# Patient Record
Sex: MALE | Race: OTHER | NOT HISPANIC OR LATINO | URBAN - METROPOLITAN AREA
[De-identification: names, ages, dates, MRNs, and addresses within clinical notes are randomized per-mention and may not be internally consistent; named-entity substitution may affect disease eponyms.]

---

## 2021-09-28 ENCOUNTER — EMERGENCY (EMERGENCY)
Facility: HOSPITAL | Age: 46
LOS: 1 days | Discharge: HOME | End: 2021-09-28
Attending: EMERGENCY MEDICINE
Payer: MEDICAID

## 2021-09-28 VITALS
HEART RATE: 101 BPM | HEIGHT: 64 IN | OXYGEN SATURATION: 99 % | SYSTOLIC BLOOD PRESSURE: 149 MMHG | WEIGHT: 179.9 LBS | RESPIRATION RATE: 18 BRPM | TEMPERATURE: 98 F | DIASTOLIC BLOOD PRESSURE: 103 MMHG

## 2021-09-28 VITALS
OXYGEN SATURATION: 99 % | DIASTOLIC BLOOD PRESSURE: 95 MMHG | SYSTOLIC BLOOD PRESSURE: 135 MMHG | TEMPERATURE: 98 F | HEART RATE: 79 BPM | RESPIRATION RATE: 18 BRPM

## 2021-09-28 LAB
ALBUMIN SERPL ELPH-MCNC: 4.6 G/DL — SIGNIFICANT CHANGE UP (ref 3.3–5)
ALP SERPL-CCNC: 73 U/L — SIGNIFICANT CHANGE UP (ref 40–120)
ALT FLD-CCNC: 42 U/L — SIGNIFICANT CHANGE UP (ref 10–45)
ANION GAP SERPL CALC-SCNC: 12 MMOL/L — SIGNIFICANT CHANGE UP (ref 5–17)
AST SERPL-CCNC: 29 U/L — SIGNIFICANT CHANGE UP (ref 10–40)
BASE EXCESS BLDV CALC-SCNC: 2 MMOL/L — SIGNIFICANT CHANGE UP (ref -2–2)
BASOPHILS # BLD AUTO: 0.05 K/UL — SIGNIFICANT CHANGE UP (ref 0–0.2)
BASOPHILS NFR BLD AUTO: 0.7 % — SIGNIFICANT CHANGE UP (ref 0–2)
BILIRUB SERPL-MCNC: 0.6 MG/DL — SIGNIFICANT CHANGE UP (ref 0.2–1.2)
BUN SERPL-MCNC: 12 MG/DL — SIGNIFICANT CHANGE UP (ref 7–23)
CA-I SERPL-SCNC: 1.28 MMOL/L — SIGNIFICANT CHANGE UP (ref 1.15–1.33)
CALCIUM SERPL-MCNC: 9.6 MG/DL — SIGNIFICANT CHANGE UP (ref 8.4–10.5)
CHLORIDE BLDV-SCNC: 103 MMOL/L — SIGNIFICANT CHANGE UP (ref 96–108)
CHLORIDE SERPL-SCNC: 104 MMOL/L — SIGNIFICANT CHANGE UP (ref 96–108)
CK SERPL-CCNC: 303 U/L — HIGH (ref 30–200)
CO2 BLDV-SCNC: 31 MMOL/L — HIGH (ref 22–26)
CO2 SERPL-SCNC: 24 MMOL/L — SIGNIFICANT CHANGE UP (ref 22–31)
CREAT SERPL-MCNC: 0.97 MG/DL — SIGNIFICANT CHANGE UP (ref 0.5–1.3)
CRP SERPL-MCNC: <3 MG/L — SIGNIFICANT CHANGE UP (ref 0–4)
D DIMER BLD IA.RAPID-MCNC: <150 NG/ML DDU — SIGNIFICANT CHANGE UP
EOSINOPHIL # BLD AUTO: 0.23 K/UL — SIGNIFICANT CHANGE UP (ref 0–0.5)
EOSINOPHIL NFR BLD AUTO: 3.3 % — SIGNIFICANT CHANGE UP (ref 0–6)
GAS PNL BLDV: 137 MMOL/L — SIGNIFICANT CHANGE UP (ref 136–145)
GAS PNL BLDV: SIGNIFICANT CHANGE UP
GLUCOSE BLDV-MCNC: 126 MG/DL — HIGH (ref 70–99)
GLUCOSE SERPL-MCNC: 122 MG/DL — HIGH (ref 70–99)
HCO3 BLDV-SCNC: 29 MMOL/L — SIGNIFICANT CHANGE UP (ref 22–29)
HCT VFR BLD CALC: 45.9 % — SIGNIFICANT CHANGE UP (ref 39–50)
HCT VFR BLDA CALC: 45 % — SIGNIFICANT CHANGE UP (ref 39–51)
HGB BLD CALC-MCNC: 15.1 G/DL — SIGNIFICANT CHANGE UP (ref 12.6–17.4)
HGB BLD-MCNC: 14.6 G/DL — SIGNIFICANT CHANGE UP (ref 13–17)
IMM GRANULOCYTES NFR BLD AUTO: 0.3 % — SIGNIFICANT CHANGE UP (ref 0–1.5)
LACTATE BLDV-MCNC: 1.1 MMOL/L — SIGNIFICANT CHANGE UP (ref 0.7–2)
LYMPHOCYTES # BLD AUTO: 2.11 K/UL — SIGNIFICANT CHANGE UP (ref 1–3.3)
LYMPHOCYTES # BLD AUTO: 29.9 % — SIGNIFICANT CHANGE UP (ref 13–44)
MAGNESIUM SERPL-MCNC: 2 MG/DL — SIGNIFICANT CHANGE UP (ref 1.6–2.6)
MCHC RBC-ENTMCNC: 25.4 PG — LOW (ref 27–34)
MCHC RBC-ENTMCNC: 31.8 GM/DL — LOW (ref 32–36)
MCV RBC AUTO: 79.8 FL — LOW (ref 80–100)
MONOCYTES # BLD AUTO: 0.53 K/UL — SIGNIFICANT CHANGE UP (ref 0–0.9)
MONOCYTES NFR BLD AUTO: 7.5 % — SIGNIFICANT CHANGE UP (ref 2–14)
NEUTROPHILS # BLD AUTO: 4.12 K/UL — SIGNIFICANT CHANGE UP (ref 1.8–7.4)
NEUTROPHILS NFR BLD AUTO: 58.3 % — SIGNIFICANT CHANGE UP (ref 43–77)
NRBC # BLD: 0 /100 WBCS — SIGNIFICANT CHANGE UP (ref 0–0)
PCO2 BLDV: 54 MMHG — SIGNIFICANT CHANGE UP (ref 42–55)
PH BLDV: 7.34 — SIGNIFICANT CHANGE UP (ref 7.32–7.43)
PHOSPHATE SERPL-MCNC: 3 MG/DL — SIGNIFICANT CHANGE UP (ref 2.5–4.5)
PLATELET # BLD AUTO: 189 K/UL — SIGNIFICANT CHANGE UP (ref 150–400)
PO2 BLDV: 29 MMHG — SIGNIFICANT CHANGE UP (ref 25–45)
POTASSIUM BLDV-SCNC: 3.8 MMOL/L — SIGNIFICANT CHANGE UP (ref 3.5–5.1)
POTASSIUM SERPL-MCNC: 3.8 MMOL/L — SIGNIFICANT CHANGE UP (ref 3.5–5.3)
POTASSIUM SERPL-SCNC: 3.8 MMOL/L — SIGNIFICANT CHANGE UP (ref 3.5–5.3)
PROT SERPL-MCNC: 7 G/DL — SIGNIFICANT CHANGE UP (ref 6–8.3)
RBC # BLD: 5.75 M/UL — SIGNIFICANT CHANGE UP (ref 4.2–5.8)
RBC # FLD: 13.6 % — SIGNIFICANT CHANGE UP (ref 10.3–14.5)
SAO2 % BLDV: 50.3 % — LOW (ref 67–88)
SARS-COV-2 RNA SPEC QL NAA+PROBE: SIGNIFICANT CHANGE UP
SODIUM SERPL-SCNC: 140 MMOL/L — SIGNIFICANT CHANGE UP (ref 135–145)
WBC # BLD: 7.06 K/UL — SIGNIFICANT CHANGE UP (ref 3.8–10.5)
WBC # FLD AUTO: 7.06 K/UL — SIGNIFICANT CHANGE UP (ref 3.8–10.5)

## 2021-09-28 PROCEDURE — U0003: CPT

## 2021-09-28 PROCEDURE — 85014 HEMATOCRIT: CPT

## 2021-09-28 PROCEDURE — 70450 CT HEAD/BRAIN W/O DYE: CPT | Mod: 26,59,MA

## 2021-09-28 PROCEDURE — 82435 ASSAY OF BLOOD CHLORIDE: CPT

## 2021-09-28 PROCEDURE — 93970 EXTREMITY STUDY: CPT | Mod: 26

## 2021-09-28 PROCEDURE — 99285 EMERGENCY DEPT VISIT HI MDM: CPT | Mod: CS

## 2021-09-28 PROCEDURE — 93970 EXTREMITY STUDY: CPT

## 2021-09-28 PROCEDURE — 72131 CT LUMBAR SPINE W/O DYE: CPT | Mod: MA

## 2021-09-28 PROCEDURE — 70496 CT ANGIOGRAPHY HEAD: CPT | Mod: 26,MA

## 2021-09-28 PROCEDURE — 85018 HEMOGLOBIN: CPT

## 2021-09-28 PROCEDURE — 83605 ASSAY OF LACTIC ACID: CPT

## 2021-09-28 PROCEDURE — 82330 ASSAY OF CALCIUM: CPT

## 2021-09-28 PROCEDURE — 84132 ASSAY OF SERUM POTASSIUM: CPT

## 2021-09-28 PROCEDURE — 84100 ASSAY OF PHOSPHORUS: CPT

## 2021-09-28 PROCEDURE — 84295 ASSAY OF SERUM SODIUM: CPT

## 2021-09-28 PROCEDURE — 85025 COMPLETE CBC W/AUTO DIFF WBC: CPT

## 2021-09-28 PROCEDURE — 70498 CT ANGIOGRAPHY NECK: CPT | Mod: MA

## 2021-09-28 PROCEDURE — 85379 FIBRIN DEGRADATION QUANT: CPT

## 2021-09-28 PROCEDURE — 80053 COMPREHEN METABOLIC PANEL: CPT

## 2021-09-28 PROCEDURE — 82550 ASSAY OF CK (CPK): CPT

## 2021-09-28 PROCEDURE — 85045 AUTOMATED RETICULOCYTE COUNT: CPT

## 2021-09-28 PROCEDURE — U0005: CPT

## 2021-09-28 PROCEDURE — 70498 CT ANGIOGRAPHY NECK: CPT | Mod: 26,MA

## 2021-09-28 PROCEDURE — 70450 CT HEAD/BRAIN W/O DYE: CPT | Mod: MA

## 2021-09-28 PROCEDURE — 82947 ASSAY GLUCOSE BLOOD QUANT: CPT

## 2021-09-28 PROCEDURE — 82962 GLUCOSE BLOOD TEST: CPT

## 2021-09-28 PROCEDURE — 82803 BLOOD GASES ANY COMBINATION: CPT

## 2021-09-28 PROCEDURE — 36415 COLL VENOUS BLD VENIPUNCTURE: CPT

## 2021-09-28 PROCEDURE — 82728 ASSAY OF FERRITIN: CPT

## 2021-09-28 PROCEDURE — 72131 CT LUMBAR SPINE W/O DYE: CPT | Mod: 26,MA

## 2021-09-28 PROCEDURE — 83735 ASSAY OF MAGNESIUM: CPT

## 2021-09-28 PROCEDURE — 70496 CT ANGIOGRAPHY HEAD: CPT | Mod: MA

## 2021-09-28 PROCEDURE — 99284 EMERGENCY DEPT VISIT MOD MDM: CPT | Mod: 25

## 2021-09-28 PROCEDURE — 86769 SARS-COV-2 COVID-19 ANTIBODY: CPT

## 2021-09-28 PROCEDURE — 82565 ASSAY OF CREATININE: CPT

## 2021-09-28 PROCEDURE — 86140 C-REACTIVE PROTEIN: CPT

## 2021-09-28 NOTE — ED PROVIDER NOTE - OBJECTIVE STATEMENT
46y M pmhx htn, pre-dm, presents to ED with the cc of b/l LE weakness/numbness/tingling x 1.5 weeks. Pt states symptoms initially in distal lower extremities and has progressively ascended up both legs. Last night pt unable to walk when getting out of bed and came to ED for eval. Severity waxes and wanes worse with prolonged walking or prolonged sitting/lying. Endorses genital tingling. No back pain or trauma. No bowel or bladder dysfunction. No f/c or IV drug use. Pt also endorses numbness to the L side of his head and neck x 4 months. Recently diagnosed with "nerve problem." Denies vision, changes, n/v.

## 2021-09-28 NOTE — ED PROVIDER NOTE - NSFOLLOWUPINSTRUCTIONS_ED_ALL_ED_FT
1) Follow-up with your primary care provider in 1-2 days.        Follow-up with your neurologist or at the [facility] within 1-2 weeks at [address] [place] [address] call [phone] to schedule this appointment.              Follow-up with [facility] within 1-2 weeks    [facility]  Sleep Disorders Center  [phone]  Fax: [phone] [address] [place] [address]    2) Continue to take all medications as prescribed.    3) Rest and drink plenty of water. Pain can be managed with Acetaminophen (aka Tylenol) and Ibuprofen (aka Motrin or Advil) over the counter as directed. Take with food.    4) Return to the ER for any new or worsening symptoms.

## 2021-09-28 NOTE — CONSULT NOTE ADULT - SUBJECTIVE AND OBJECTIVE BOX
NEUROLOGY DEPT. INPATIENT CONSULT  Consult Information:   NS page [id number] ([id number]) before calling spectra [id number] unless code, urgent, transfer  [facility] page [id number] ([id number]) before calling spectra [id number] unless code, urgent, transfer      HISTORY OF PRESENT ILLNESS:    GEORGIA MOYER is a 46y ([birth year]) Male presenting 09-28-21 Emergency with CC     REVIEW OF SYMPTOMS  A 10-point ROS was *****    PAST MEDICAL & SURGICAL HISTORY:   COVID-19 vaccine series completed  H/O: HTN (hypertension)    HOME MEDICATIONS:     EMERGENCY DEPARTMENT:    ALLERGIES:     OBJECTIVE DATA  Vital Signs Last 24 Hrs  T(C): 36.7 (28 Sep 2021 09:14), Max: 36.7 (28 Sep 2021 09:14)  T(F): 98 (28 Sep 2021 09:14), Max: 98 (28 Sep 2021 09:14)  HR: 93 (28 Sep 2021 09:40) (93 - 101)  BP: 135/93 (28 Sep 2021 09:40) (135/93 - 149/103)  RR: 16 (28 Sep 2021 09:40) (16 - 18)  SpO2: 99% (28 Sep 2021 09:40) (99% - 99%)    I&O's Summary    PHYSICAL EXAM:  Constitutional-   Pulm-  Cardio    ROUTINE STUDIES:  CAPILLARY BLOOD GLUCOSE  POCT Blood Glucose.: 126 mg/dL (28 Sep 2021 10:32)                        14.6   7.06  )-----------( 189      ( 28 Sep 2021 10:31 )             45.9     09-28    140  |  104  |  12  ----------------------------<  122<H>  3.8   |  24  |  0.97    Ca    9.6      28 Sep 2021 10:31  Phos  3.0     09-28  Mg     2.0     09-28    TPro  7.0  /  Alb  4.6  /  TBili  0.6  /  DBili  x   /  AST  29  /  ALT  42  /  AlkPhos  73  09-28    NEUROIMAGING:                   NEUROLOGY DEPT. INPATIENT CONSULT  Consult Information:   NS page 1282 ([id number]) before calling spectra [id number] unless code, urgent, transfer  [facility] page [id number] ([email or web address]) before calling spectra [id number] unless code, urgent, transfer      HISTORY OF PRESENT ILLNESS:    GEORGIA MOYER is a 46y ([birth year]) RHd Male PMHx occipital neuralgia s/p trigger point injections, intermittent cervical radiculopathy / sciatica on gabapentin  presenting 09-28-21 Emergency with CC headache, intermittent LE paresthesias over feet and shins x2 weeks in s/o 6-8 months of mirage of worsening neurological symptoms. At present, patient feels at his baseline but was concerned with the number of symptoms he has been having, the LE paresthesias that prevented him from sleeping last night and an episode of paralysis this morning that he should come to ED.    States he has been following a neurologist for few months for his discomfort with head/neck symptoms and had cervical CT that had findings to explain the intermittent radiation of pain, numbness/tingling he gets from neck into arms, neck. Occasionally has similar nerve pain in legs that arises from back. He states he has persistent numbness over the L scalp with radiation from posterior to anterior head that improved with trigger point injection but did not relieve the episodes where he additionally experiences uL > bL eye pain, photophobia, hyperacousics, allodynia, numbness over check withOUT claudication, slurred speech, or involvement in body, which are not present at this time.     Pt endorses he has a long standing history of hypersomnolence prior to moving to [place] and has noticed all his symptoms described are worsened in setting of episodes when he is "fighting sleep" especially when driving, at work. Pt endorses bL, symmetric numbness/tingling, weakness, pain that improves with rest most persistent in the feet and shins. He recently saw his daughter, was told he was pre-diabetic but otherwise studies were okay. He endorses being on his feet long hours as  but only more recently has become a problem with lower legs below knee that comes/goes. At night, with worse episode last evening, the paresthesias was extremely irritating as he feels restless legs and wants to keep moving them, rubbing them which helps. States last night this was preventing his sleep so he took melatonin and then awoke this morning to feel "weak in my whole being like I can not keep my eyes open, can not move any part of body". After an hour patient states he was able to feel more awake and move all his limbs as usual. Presently all symptoms are resolved. He states last week when he was working he had a similar event where he felt whole body weakness out of no where with weird feeling in body which resolved completely. Can not recall what was going on at that time emotionally but says usually if he is forcing himself to stay awake all his symptoms get worse and he has had episodes of falling asleep inappropriately. Notes at work if hes allowed to take short nap he wakes up refreshed and with improved symptoms including current shins, feet paresthesias. Endorses hypnagogic hallucination last week where felt sensation that a body was in bed with him (wife) but was later told that she was never there. No loss of consciousness, head trama, car accidents, family history of sleep disorders, migraine, seizure, stroke to his knowledge. Eats well rounded diet including meat, no recent sickness, fever, nausea/vomiting, constipation, cough, viral illness, chest pain, sob, snoring.     At this time, patient states he feels well and does not need any medication for any symptoms. Largely was more concerned around all the symptoms he has been having.     REVIEW OF SYMPTOMS  A 10-point ROS was completed and negative except those listed above.    PAST MEDICAL & SURGICAL HISTORY:   COVID-19 vaccine series completed  H/O: HTN (hypertension)    HOME MEDICATIONS:   one blood pressure medication QD, forgets name   multivitamin     ALLERGIES:     OBJECTIVE DATA  Vital Signs Last 24 Hrs  T(C): 36.7 (28 Sep 2021 09:14), Max: 36.7 (28 Sep 2021 09:14)  T(F): 98 (28 Sep 2021 09:14), Max: 98 (28 Sep 2021 09:14)  HR: 93 (28 Sep 2021 09:40) (93 - 101)  BP: 135/93 (28 Sep 2021 09:40) (135/93 - 149/103)  RR: 16 (28 Sep 2021 09:40) (16 - 18)  SpO2: 99% (28 Sep 2021 09:40) (99% - 99%)    PHYSICAL EXAM:  Constitutional- sitting at end of bed, appears tired   Pulm- comfortable RR on room air  Extremities - no edema, rashes, open wounds   HEENT - + bL occipital / cervical trigger points without Spinal pin point tenderness  NEUROLOGICAL EXAM  - Mental Status:  AAOx3; speech is fluent with intact naming and comprehension  - Cranial Nerves II-XII:    II:  PERRLA; visual fields are full to confrontation  III, IV, VI:  EOMI, no nystagmus  V:  facial sensation is intact in the V1-V3 distribution bilaterally ; endorses intermittent numbness over L V2  VII:  face is symmetric with normal eye closure and smile  VIII:  hearing is intact to finger rub  IX, X:  uvula is midline and soft palate rises symmetrically  XI:  head turning and shoulder shrug are intact bilaterally  XII:  tongue protrudes in the midline  - Motor:  strength is 5/5 throughout; normal muscle bulk and tone throughout; no pronator drift ; stands sustained on tippy toes / heels without issues  - Reflexes:  2+ and symmetric at the biceps, triceps, brachioradialis; 2 and symmetric knees, and ankles;  plantar reflexes downgoing bilaterally  - Sensory:  intact to light touch, pin prick, and joint-position sense throughout upper/lower limbs, digits ; 2 point discrimination intact in all fingers   - Coordination:  finger-nose-finger and heel-knee-shin intact without dysmetria; rapid alternating hand movements intact  - Gait:  normal steps, base, arm swing, and turning; heel and toe walking are normal; tandem gait is normal; Romberg testing is negative      ROUTINE STUDIES:  CAPILLARY BLOOD GLUCOSE  POCT Blood Glucose.: 126 mg/dL (28 Sep 2021 10:32)                        14.6   7.06  )-----------( 189      ( 28 Sep 2021 10:31 )             45.9     09-28    140  |  104  |  12  ----------------------------<  122<H>  3.8   |  24  |  0.97    Ca    9.6      28 Sep 2021 10:31  Phos  3.0     09-28  Mg     2.0     09-28    TPro  7.0  /  Alb  4.6  /  TBili  0.6  /  DBili  x   /  AST  29  /  ALT  42  /  AlkPhos  73  09-28    NEUROIMAGING:    CTH/CTA, Lumbar Spine REVIEWED

## 2021-09-28 NOTE — ED PROVIDER NOTE - ATTENDING CONTRIBUTION TO CARE
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 Pt comes to ed c/o low back pain onset past 1.5 wk. 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 Pt comes to ed c/o low back pain onset past 1.5 wk. Pain worse if he is sitting for a long time and he tries to get up. Rad down both legs. No Change in bowel or bladder habits,no numbness to buttox, No fever, chills. no trauma. Pain waxes and wanes. Took tylenol with mod relief. when wears off pain recurrs. Has pain w ambulating. Has also c/o left side ha for months off/on with numbness occasionally parietal scalp. 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 Pt comes to ed c/o low back pain onset past 1.5 wk. Pain worse if he is sitting for a long time and he tries to get up. Rad down both legs. No Change in bowel or bladder habits,no numbness to buttox, No fever, chills. no trauma. Pain waxes and wanes. Took tylenol with mod relief. when wears off pain recurrs. Has pain w ambulating. Has also c/o left side ha for months off/on with numbness occasionally parietal scalp. PE WDWN male awake alert normocephalic atraumatic neck supple chest clear anterior & posterior cv no rubs, gallops or murmurs abd soft +Bs no mass guarding rebound, Neuro gcs 15 speech fluent power 5/5 all extr sensation intact gait normal romberg normal. Slight pain w slr bria  Eddy Velazquez MD, Facep

## 2021-09-28 NOTE — ED PROVIDER NOTE - CLINICAL SUMMARY MEDICAL DECISION MAKING FREE TEXT BOX
PT pw #2 problems #1 low back pain rad to bria legs past 1.5w, No red flags, weakness, fever, change in bowel/bladder habits, ivda, cancer. #2 ha left for months w numbness. to parietal scalp off/on. low back pain by itself not alarming but w chronic patton will ct for ro mass lesions, tx pain if all neg f/u pain management pmd  Eddy Velazquez MD, Facep

## 2021-09-28 NOTE — ED ADULT NURSE NOTE - OBJECTIVE STATEMENT
46y male a/ox4 ambulatory with steady gait with PMH of HTN, presenting to ed with low back pain x 1.5 wk. Pain worse if he is sitting for a long time and he tries to get up. Rad down both legs. No Change in bowel or bladder habits, no numbness to buttock, No fever, chills. no trauma. Pain waxes and wanes. Took tylenol with mod relief. when wears off pain recurrs. Has pain w ambulating. Has also c/o left side ha for months off/on with numbness occasionally parietal scalp.

## 2021-09-28 NOTE — CONSULT NOTE ADULT - ASSESSMENT
GEORGIA MOYER is a 46y ([birth year]) RHd Male  PMHx occipital neuralgia s/p trigger point injections, intermittent cervical radiculopathy / sciatica on gabapentin presenting 09-28-21 Emergency with CC headache, LE paresthesias over feet and shins, episode of whole body weakness in s/o 6-8 months of mirage of worsening neurological symptoms. At present, pt feels at his baseline but was concerned with the number of symptoms he has been having, the LE paresthesias that prevented him from sleeping last night subsequently following by an episode of paralysis this morning that resolved. On examination, pt is neurologically intact + occipital trigger points on palpation.     Most of patient's symptoms are associated around sleep / disorder, (episodic whole body weakness w intact awareness, hypersomnolence, worsening of lower leg symptoms at night)  and recommended he be evaluated outpatient with sleep study. 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     Impression  multiple neurologic complaints w LOW concern for emergency/inpatient conditions including GBS, cord compression, stroke ; would rule out venous thrombosis    Plan:  discharge to home with outpatient neurology / sleep medicine follow up   [] LE duplex to rule out DVT   - CTH, CTA, Lumbar spine reviewed   - no further imaging recommended (cervical CT already done outpatient)   -  home gabapentin   - Patient can follow up with general neurology at [address] [place] [address] 1-2 weeks after discharge. Please instruct the patient to call [phone] to schedule this appointment or his neurologist as discussed      to follow up regarding  trigeminal autonomic cephalgia vs migrainous etiology of historical uL > bL eye pain, photophobia, hyperacousics, allodynia, numbness over cheek  - Patient should be evaluated by Sleep Medicine / Pulmonology to rule out sleep disorder       to follow up regarding episodic whole body weakness w intact awareness, hypersomnolence, worsening of lower leg symptoms at night    Attending Attestation to Follow.

## 2021-09-28 NOTE — ED PROVIDER NOTE - PHYSICAL EXAMINATION
GEN: Pt non-toxic in NAD, A&Ox3.  PSYCH: Affect and mood appropriate.  EYES: Sclera white w/o injection, EOMI, PERRLA.   ENT: Head NCAT. Neck supple FROM. Airway patent.  RESP: CTA b/l, no wheezes, rales, or rhonchi.   CARDIAC: RRR, clear distinct S1, S2, no appreciable murmurs.  ABD: Abdomen soft, non-tender. No CVAT b/l.  MSK: No joint erythema or obvious deformity. No obvious spinal deformity, no midline spinal ttp, no step-offs. FROM b/l UE and LE w/o pain.   NEURO: No focal motor or sensory deficits. Normal gait. Strength 5/5 throughout. Decreased sensation b/l LE L>R.   VASC: Radial and dorsalis pedis pulses 2+ b/l. No edema or calf tenderness.  SKIN: No rashes or lesions.

## 2021-09-28 NOTE — ED PROVIDER NOTE - PROGRESS NOTE DETAILS
Jerad Hoover PA-C: neuro paged Jerad Hoover PA-C: Hx varies from initial hx obtained by Dr. Velazquez. Case discussed and plan reviewed. Jerad Hoover PA-C: s/w neuro. will see pt in ED. Recs given over phone - Ct angio head, inflammatory labs, covid antibodies. Endorsed to Dr BESSY Velazquez MD, Facep Jerad Hoover PA-C: Labs and imaging non-actionable. Neuro spent extensive amount of time w/ pt. Stable for dc from neuro standpoint, rec LE duplex prior to dc. Symptoms possibly due to sleep disorder. Pt to f/u with neuro and sleep medicine. Jerad Hoover PA-C: Labs and imaging non-actionable. Neuro spent extensive amount of time w/ pt. Stable for dc from neuro standpoint, rec LE duplex prior to dc. Symptoms possibly due to sleep disorder. Ambulating in ED w/o difficulty. Pt to f/u with neuro and sleep medicine. Jerad Hoover PA-C: No evidence of dvt. Pt stable for DC.

## 2021-09-28 NOTE — ED PROVIDER NOTE - PATIENT PORTAL LINK FT
You can access the FollowMyHealth Patient Portal offered by [facility] by registering at the following website: [email or web address]. By joining [facility]’s FollowMyHealth portal, you will also be able to view your health information using other applications (apps) compatible with our system.

## 2021-09-29 LAB
COVID-19 NUCLEOCAPSID GAM AB INTERP: NEGATIVE — SIGNIFICANT CHANGE UP
COVID-19 NUCLEOCAPSID TOTAL GAM ANTIBODY RESULT: 0.07 INDEX — SIGNIFICANT CHANGE UP
COVID-19 SPIKE DOMAIN AB INTERP: POSITIVE
COVID-19 SPIKE DOMAIN ANTIBODY RESULT: >250 U/ML — HIGH
FERRITIN SERPL-MCNC: 162 NG/ML — SIGNIFICANT CHANGE UP (ref 30–400)
SARS-COV-2 IGG+IGM SERPL QL IA: 0.07 INDEX — SIGNIFICANT CHANGE UP
SARS-COV-2 IGG+IGM SERPL QL IA: >250 U/ML — HIGH
SARS-COV-2 IGG+IGM SERPL QL IA: NEGATIVE — SIGNIFICANT CHANGE UP
SARS-COV-2 IGG+IGM SERPL QL IA: POSITIVE

## 2021-10-07 ENCOUNTER — EMERGENCY (EMERGENCY)
Facility: HOSPITAL | Age: 46
LOS: 1 days | Discharge: HOME | End: 2021-10-07
Attending: EMERGENCY MEDICINE | Admitting: EMERGENCY MEDICINE
Payer: MEDICAID

## 2021-10-07 VITALS
HEIGHT: 64 IN | DIASTOLIC BLOOD PRESSURE: 81 MMHG | WEIGHT: 182.1 LBS | TEMPERATURE: 98 F | RESPIRATION RATE: 20 BRPM | OXYGEN SATURATION: 99 % | SYSTOLIC BLOOD PRESSURE: 133 MMHG | HEART RATE: 90 BPM

## 2021-10-07 PROBLEM — Z86.79 PERSONAL HISTORY OF OTHER DISEASES OF THE CIRCULATORY SYSTEM: Chronic | Status: ACTIVE | Noted: 2021-09-28

## 2021-10-07 PROBLEM — Z92.29 PERSONAL HISTORY OF OTHER DRUG THERAPY: Chronic | Status: ACTIVE | Noted: 2021-09-28

## 2021-10-07 PROCEDURE — 99282 EMERGENCY DEPT VISIT SF MDM: CPT

## 2021-10-07 PROCEDURE — 99284 EMERGENCY DEPT VISIT MOD MDM: CPT

## 2021-10-07 NOTE — ED PROVIDER NOTE - CLINICAL SUMMARY MEDICAL DECISION MAKING FREE TEXT BOX
46M PMH occipital neuralgia, cervical radiculopathy presenting for blurry vision mostly associated with vertigo for the past 3 months, no other associated symptoms. VSS in ED. Neuro exam intact, currently asymptomatic in ED  Likely BPPV vs. symptoms associated with occipital neuralgia. Unlikely CVA/TIA given 46M PMH occipital neuralgia, cervical radiculopathy presenting for blurry vision mostly associated with vertigo for the past 3 months, no other associated symptoms. VSS in ED. Neuro exam intact, currently asymptomatic in ED  Likely BPPV vs. symptoms associated with occipital neuralgia. Unlikely CVA/TIA given currently asymptomatic/no associated neuro deficits/intermittent nature of symptoms  Plan: will have pt f/u with 46M PMH occipital neuralgia, cervical radiculopathy presenting for blurry vision mostly associated with vertigo for the past 3 months, no other associated symptoms. VSS in ED. Neuro exam intact, currently asymptomatic in ED  Likely BPPV vs. symptoms associated with occipital neuralgia. Unlikely CVA/TIA given currently asymptomatic/no associated neuro deficits/intermittent nature of symptoms  Plan: will have pt f/u with his neurologist and ophthalmologist

## 2021-10-07 NOTE — ED PROVIDER NOTE - NS ED ROS FT
CONST: no fevers, no chills  ENT: no sore throat  CV: no chest pain, no leg swelling  RESP: no shortness of breath, no cough  ABD: no abdominal pain, no nausea, no vomiting, no diarrhea  : no dysuria, no flank pain, no hematuria  MSK: no back pain, no extremity pain  NEURO: no current blurry vision, no current vertigo, no headache or additional neurologic complaints  SKIN:  no rash

## 2021-10-07 NOTE — ED PROVIDER NOTE - NSFOLLOWUPINSTRUCTIONS_ED_ALL_ED_FT
Please follow up with both your Neurologist and Ophthalmologist within the next 48-72 hours for further evaluation of your symptoms    Return to the Emergency Department if you have any new or worsening symptoms    See the document labeled "Vertigo" for more information on your diagnosis, as well as symptoms to look out for to return to the Emergency Department

## 2021-10-07 NOTE — ED PROVIDER NOTE - PHYSICAL EXAMINATION
Physical Exam:  Gen: NAD, non-toxic appearing, awake alert   Head: NCAT  HEENT: EOMI, PEERL, visual acuity 20/20normal conjunctiva, oral mucosa moist  Lung: CTAB, no respiratory distress, no wheezes/rhonchi/rales B/L, speaking in full sentences  CV: RRR, no murmurs, rubs or gallops  Abd: soft, NT, ND, no guarding, no rigidity, no rebound tenderness, no CVA tenderness   MSK: no visible deformities, ROM normal in UE/LE, no spinal tenderness   Neuro: CN's 2-12 grossly intact, ambulating w/o difficulty, normal finger nose finger test, normal heel to shin test, strength 5/5 B/L in UE's and LE's, sensation to light touch intact in UE's and LE's B/L  Skin: Warm, well perfused, no rash, no leg swelling  Psych: normal affect, calm  ~Dylan Epps MD (PGY-2) Physical Exam:  Gen: NAD, non-toxic appearing, awake alert   HEENT: EOMI, PEERL, visual acuity 20/20 B/L, normal conjunctiva, oral mucosa moist  Lung: CTAB, no respiratory distress, no wheezes/rhonchi/rales B/L, speaking in full sentences  CV: RRR, no murmurs, rubs or gallops  Abd: soft, NT, ND, no guarding, no rigidity, no rebound tenderness  MSK: no visible deformities, ROM normal in UE/LE  Neuro: CN's 2-12 grossly intact, ambulating w/o difficulty, normal finger nose finger test, normal heel to shin test, strength 5/5 B/L in UE's and LE's, sensation to light touch intact in UE's and LE's B/L  Skin: Warm, well perfused, no rash, no leg swelling  Psych: normal affect, calm  ~Dylan Epps MD (PGY-2)

## 2021-10-07 NOTE — ED PROVIDER NOTE - ATTENDING CONTRIBUTION TO CARE
46M PMH occipital neuralgia s/p trigger point injections, intermittent cervical radiculopathy / sciatica on gabapentin presenting for intermittent blurry vision but not now has seen the eye doctor this past week which was nl uses reading glasses, nl visual acuity in ed with no sts at this time, has outpt neuro follow up, ct head and cta nl last weelk, Neuro: Alert, awake,coherent, interactive, cooperative/consolable. CN 3-12 intact and WITHOUT deficits. NO focal weakness; NO drift; NO droop; NO nystagmus; NO vertigo; NO diplopia; NO headache, NO numbness, tingling, or weakness; NO dysmetria; NO dysdidochokinesia; INTACT gait (tandem, toe and heel).

## 2021-10-07 NOTE — ED PROVIDER NOTE - PATIENT PORTAL LINK FT
You can access the FollowMyHealth Patient Portal offered by [facility] by registering at the following website: [email or web address]. By joining [employer]’s FollowMyHealth portal, you will also be able to view your health information using other applications (apps) compatible with our system.

## 2021-10-07 NOTE — ED ADULT TRIAGE NOTE - CHIEF COMPLAINT QUOTE
vision changes since last week, states he saw his opthamologist and was cleared. states "my vision is getting darker" in peripherals.

## 2021-10-07 NOTE — ED PROVIDER NOTE - OBJECTIVE STATEMENT
46M PMH occipital neuralgia s/p trigger point injections, intermittent cervical radiculopathy / sciatica on gabapentin presenting for intermittent blurry vision (triage note states pt's vision gets darker but pt is denying this on my conversation). He states for the past 3 months, he has had intermittent room-spinning sensation with b/l blurry vision; symptoms last 2-3 hrs at a time w/ no clear provocative/palliative factors. At times, he states he gets the blurry vision w/o the associated vertigo, but usually they are associated. Denies any associated headaches, nausea/vomiting, worsening numbness/tingling (at baseline has UE tingling intermittently from cervical radiculopathy but nothing acutely worsening), weakness. Currently denies having any blurry vision or vertigo symptoms. No fevers/chills, chest pain, SOB, abdominal pain, leg swelling

## 2022-05-26 ENCOUNTER — EMERGENCY (EMERGENCY)
Facility: HOSPITAL | Age: 47
LOS: 1 days | Discharge: HOME | End: 2022-05-26
Attending: EMERGENCY MEDICINE
Payer: MEDICAID

## 2022-05-26 VITALS
OXYGEN SATURATION: 99 % | DIASTOLIC BLOOD PRESSURE: 85 MMHG | RESPIRATION RATE: 20 BRPM | SYSTOLIC BLOOD PRESSURE: 122 MMHG | HEART RATE: 58 BPM

## 2022-05-26 VITALS
HEART RATE: 90 BPM | RESPIRATION RATE: 17 BRPM | OXYGEN SATURATION: 99 % | SYSTOLIC BLOOD PRESSURE: 158 MMHG | WEIGHT: 171.96 LBS | TEMPERATURE: 98 F | DIASTOLIC BLOOD PRESSURE: 87 MMHG | HEIGHT: 64 IN

## 2022-05-26 LAB
ALBUMIN SERPL ELPH-MCNC: 3.6 G/DL — SIGNIFICANT CHANGE UP (ref 3.5–5)
ALP SERPL-CCNC: 83 U/L — SIGNIFICANT CHANGE UP (ref 40–120)
ALT FLD-CCNC: 59 U/L DA — SIGNIFICANT CHANGE UP (ref 10–60)
ANION GAP SERPL CALC-SCNC: 5 MMOL/L — SIGNIFICANT CHANGE UP (ref 5–17)
AST SERPL-CCNC: 21 U/L — SIGNIFICANT CHANGE UP (ref 10–40)
BASOPHILS # BLD AUTO: 0.08 K/UL — SIGNIFICANT CHANGE UP (ref 0–0.2)
BASOPHILS NFR BLD AUTO: 0.8 % — SIGNIFICANT CHANGE UP (ref 0–2)
BILIRUB SERPL-MCNC: 0.8 MG/DL — SIGNIFICANT CHANGE UP (ref 0.2–1.2)
BUN SERPL-MCNC: 18 MG/DL — SIGNIFICANT CHANGE UP (ref 7–18)
CALCIUM SERPL-MCNC: 9.4 MG/DL — SIGNIFICANT CHANGE UP (ref 8.4–10.5)
CHLORIDE SERPL-SCNC: 105 MMOL/L — SIGNIFICANT CHANGE UP (ref 96–108)
CO2 SERPL-SCNC: 31 MMOL/L — SIGNIFICANT CHANGE UP (ref 22–31)
CREAT SERPL-MCNC: 1.13 MG/DL — SIGNIFICANT CHANGE UP (ref 0.5–1.3)
EGFR: 81 ML/MIN/1.73M2 — SIGNIFICANT CHANGE UP
EOSINOPHIL # BLD AUTO: 0.2 K/UL — SIGNIFICANT CHANGE UP (ref 0–0.5)
EOSINOPHIL NFR BLD AUTO: 2 % — SIGNIFICANT CHANGE UP (ref 0–6)
GLUCOSE SERPL-MCNC: 108 MG/DL — HIGH (ref 70–99)
HCT VFR BLD CALC: 47.3 % — SIGNIFICANT CHANGE UP (ref 39–50)
HGB BLD-MCNC: 15.4 G/DL — SIGNIFICANT CHANGE UP (ref 13–17)
IMM GRANULOCYTES NFR BLD AUTO: 0.6 % — SIGNIFICANT CHANGE UP (ref 0–1.5)
LYMPHOCYTES # BLD AUTO: 2.07 K/UL — SIGNIFICANT CHANGE UP (ref 1–3.3)
LYMPHOCYTES # BLD AUTO: 21.2 % — SIGNIFICANT CHANGE UP (ref 13–44)
MCHC RBC-ENTMCNC: 25.9 PG — LOW (ref 27–34)
MCHC RBC-ENTMCNC: 32.6 GM/DL — SIGNIFICANT CHANGE UP (ref 32–36)
MCV RBC AUTO: 79.6 FL — LOW (ref 80–100)
MONOCYTES # BLD AUTO: 0.77 K/UL — SIGNIFICANT CHANGE UP (ref 0–0.9)
MONOCYTES NFR BLD AUTO: 7.9 % — SIGNIFICANT CHANGE UP (ref 2–14)
NEUTROPHILS # BLD AUTO: 6.59 K/UL — SIGNIFICANT CHANGE UP (ref 1.8–7.4)
NEUTROPHILS NFR BLD AUTO: 67.5 % — SIGNIFICANT CHANGE UP (ref 43–77)
NRBC # BLD: 0 /100 WBCS — SIGNIFICANT CHANGE UP (ref 0–0)
PLATELET # BLD AUTO: 224 K/UL — SIGNIFICANT CHANGE UP (ref 150–400)
POTASSIUM SERPL-MCNC: 3.9 MMOL/L — SIGNIFICANT CHANGE UP (ref 3.5–5.3)
POTASSIUM SERPL-SCNC: 3.9 MMOL/L — SIGNIFICANT CHANGE UP (ref 3.5–5.3)
PROT SERPL-MCNC: 7.1 G/DL — SIGNIFICANT CHANGE UP (ref 6–8.3)
RBC # BLD: 5.94 M/UL — HIGH (ref 4.2–5.8)
RBC # FLD: 14.4 % — SIGNIFICANT CHANGE UP (ref 10.3–14.5)
SODIUM SERPL-SCNC: 141 MMOL/L — SIGNIFICANT CHANGE UP (ref 135–145)
TROPONIN I, HIGH SENSITIVITY RESULT: 27.4 NG/L — SIGNIFICANT CHANGE UP
WBC # BLD: 9.77 K/UL — SIGNIFICANT CHANGE UP (ref 3.8–10.5)
WBC # FLD AUTO: 9.77 K/UL — SIGNIFICANT CHANGE UP (ref 3.8–10.5)

## 2022-05-26 PROCEDURE — 93005 ELECTROCARDIOGRAM TRACING: CPT

## 2022-05-26 PROCEDURE — 84484 ASSAY OF TROPONIN QUANT: CPT

## 2022-05-26 PROCEDURE — 85025 COMPLETE CBC W/AUTO DIFF WBC: CPT

## 2022-05-26 PROCEDURE — 80053 COMPREHEN METABOLIC PANEL: CPT

## 2022-05-26 PROCEDURE — 96374 THER/PROPH/DIAG INJ IV PUSH: CPT

## 2022-05-26 PROCEDURE — 99284 EMERGENCY DEPT VISIT MOD MDM: CPT | Mod: 25

## 2022-05-26 PROCEDURE — 99285 EMERGENCY DEPT VISIT HI MDM: CPT

## 2022-05-26 PROCEDURE — 36415 COLL VENOUS BLD VENIPUNCTURE: CPT

## 2022-05-26 RX ORDER — IBUPROFEN 200 MG
1 TABLET ORAL
Qty: 20 | Refills: 0
Start: 2022-05-26 | End: 2022-05-30

## 2022-05-26 RX ORDER — OXYCODONE AND ACETAMINOPHEN 5; 325 MG/1; MG/1
1 TABLET ORAL ONCE
Refills: 0 | Status: DISCONTINUED | OUTPATIENT
Start: 2022-05-26 | End: 2022-05-26

## 2022-05-26 RX ORDER — KETOROLAC TROMETHAMINE 30 MG/ML
30 SYRINGE (ML) INJECTION ONCE
Refills: 0 | Status: DISCONTINUED | OUTPATIENT
Start: 2022-05-26 | End: 2022-05-26

## 2022-05-26 RX ORDER — METHOCARBAMOL 500 MG/1
2 TABLET, FILM COATED ORAL
Qty: 40 | Refills: 0
Start: 2022-05-26 | End: 2022-05-30

## 2022-05-26 RX ORDER — SODIUM CHLORIDE 9 MG/ML
3 INJECTION INTRAMUSCULAR; INTRAVENOUS; SUBCUTANEOUS ONCE
Refills: 0 | Status: COMPLETED | OUTPATIENT
Start: 2022-05-26 | End: 2022-05-26

## 2022-05-26 RX ADMIN — Medication 30 MILLIGRAM(S): at 12:59

## 2022-05-26 RX ADMIN — SODIUM CHLORIDE 3 MILLILITER(S): 9 INJECTION INTRAMUSCULAR; INTRAVENOUS; SUBCUTANEOUS at 13:07

## 2022-05-26 RX ADMIN — OXYCODONE AND ACETAMINOPHEN 1 TABLET(S): 5; 325 TABLET ORAL at 12:59

## 2022-05-26 NOTE — ED PROVIDER NOTE - PATIENT PORTAL LINK FT
You can access the FollowMyHealth Patient Portal offered by [facility] by registering at the following website: [email or web address]. By joining [employer]’s FollowMyHealth portal, you will also be able to view your health information using other applications (apps) compatible with our system. You can access the FollowMyHealth Patient Portal offered by [facility] by registering at the following website: [email or web address]. By joining [employer]’s FollowMyHealth portal, you will also be able to view your health information using other applications (apps) compatible with our system. You can access the FollowMyHealth Patient Portal offered by [facility] by registering at the following website: [email or web address]. By joining [employer]’s FollowMyHealth portal, you will also be able to view your health information using other applications (apps) compatible with our system.

## 2022-05-26 NOTE — ED PROVIDER NOTE - OBJECTIVE STATEMENT
46 year old male with history of pre-diabetes, bronchitis, and unspecified headaches presents to ED complaining of neck pain, headache, chest pain, back pain, and leg pain. He states that this has been occurring for the past year, and he previously had a negative MRI of his head and neck. Denies fever or chills. Vital signs normal. NKDA.

## 2022-05-26 NOTE — ED ADULT NURSE NOTE - NSIMPLEMENTINTERV_GEN_ALL_ED
Implemented All Universal Safety Interventions:  [place] to call system. Call bell, personal items and telephone within reach. Instruct patient to call for assistance. Room bathroom lighting operational. Non-slip footwear when patient is off stretcher. Physically safe environment: no spills, clutter or unnecessary equipment. Stretcher in lowest position, wheels locked, appropriate side rails in place. Implemented All Universal Safety Interventions:  [place] to call system. Call bell, personal items and telephone within reach. Instruct patient to call for assistance. Room bathroom lighting operational. Non-slip footwear when patient is off stretcher. Physically safe environment: no spills, clutter or unnecessary equipment. Stretcher in lowest position, wheels locked, appropriate side rails in place. Implemented All Universal Safety Interventions:  [place] to call system. Call bell, personal items and telephone within reach. Instruct patient to call for assistance. Room bathroom lighting operational. Non-slip footwear when patient is off stretcher. Physically safe environment: no spills, clutter or unnecessary equipment. Stretcher in lowest position, wheels locked, appropriate side rails in place.

## 2022-11-17 NOTE — ED ADULT TRIAGE NOTE - RESPIRATORY RATE (BREATHS/MIN)
Lab Results   Component Value Date    WBC 6.88 11/17/2022    HGB 12.9 11/17/2022    HCT 36.9 11/17/2022    MCV 98.7 (H) 11/17/2022     11/17/2022     Lab Results   Component Value Date    NEUTROABS 3.58 11/17/2022     Lab Results   Component Value Date     10/28/2022    K 3.8 10/28/2022    CL 97 (L) 10/28/2022    CO2 30 (H) 10/28/2022    BUN 4 (L) 10/28/2022    CREATININE 0.7 10/28/2022    GLUCOSE 95 10/28/2022    CALCIUM 9.5 10/28/2022    LABGLOM >60 10/28/2022 20
